# Patient Record
Sex: FEMALE | Race: WHITE
[De-identification: names, ages, dates, MRNs, and addresses within clinical notes are randomized per-mention and may not be internally consistent; named-entity substitution may affect disease eponyms.]

---

## 2017-07-12 ENCOUNTER — HOSPITAL ENCOUNTER (OUTPATIENT)
Dept: HOSPITAL 47 - RADUSWWP | Age: 37
Discharge: HOME | End: 2017-07-12
Payer: COMMERCIAL

## 2017-07-12 DIAGNOSIS — N92.1: Primary | ICD-10-CM

## 2017-07-12 PROCEDURE — 76856 US EXAM PELVIC COMPLETE: CPT

## 2017-07-12 NOTE — US
EXAMINATION TYPE: US pelvic complete

 

DATE OF EXAM: 7/12/2017

 

COMPARISON: NONE

 

CLINICAL HISTORY: 37-year-old female N92.1 Excessive and frequent menstruation with pelvic pain durin
g first 2 days of menses, tubal ligation 11 years ago

 

TECHNIQUE:  Transabdominal (TA)

 

FINDINGS:

 

Date of LMP:  1.5 weeks ago

 

Uterus: Anteverted measuring 8.6 x 4.2 x 5.6 cm slight myometrial heterogeneity.

 

Endometrial Stripe: 1.0 cm, within normal limits.

 

Right Ovary:  4.0 x 1.7 x 2.7 cm for a volume of 9.3 mL.

 

Left Ovary:  3.7 x 2.3 x 2.2 cm for a volume of 9.6 mL.

 

Follicular changes present on both sides.

 

Small amount of pelvic free fluid likely physiologic. No evident adnexal abnormality.

 

 

 

IMPRESSION: 

Small amount of pelvic free fluid likely physiologic. Slight myometrial heterogeneity may be seen in 
the setting of diffuse small fibroid change or adenomyosis.

## 2017-08-14 ENCOUNTER — HOSPITAL ENCOUNTER (OUTPATIENT)
Dept: HOSPITAL 47 - LABPAT | Age: 37
Discharge: HOME | End: 2017-08-14
Payer: COMMERCIAL

## 2017-08-14 DIAGNOSIS — Z01.812: Primary | ICD-10-CM

## 2017-08-14 LAB
BASOPHILS # BLD AUTO: 0 K/UL (ref 0–0.2)
BASOPHILS NFR BLD AUTO: 1 %
CH: 33.1
CHCM: 33.3
EOSINOPHIL # BLD AUTO: 0.2 K/UL (ref 0–0.7)
EOSINOPHIL NFR BLD AUTO: 3 %
ERYTHROCYTE [DISTWIDTH] IN BLOOD BY AUTOMATED COUNT: 4.2 M/UL (ref 3.8–5.4)
ERYTHROCYTE [DISTWIDTH] IN BLOOD: 12.9 % (ref 11.5–15.5)
HCT VFR BLD AUTO: 41.9 % (ref 34–46)
HDW: 2.12
HGB BLD-MCNC: 13.2 GM/DL (ref 11.4–16)
LUC NFR BLD AUTO: 2 %
LYMPHOCYTES # SPEC AUTO: 2.1 K/UL (ref 1–4.8)
LYMPHOCYTES NFR SPEC AUTO: 30 %
MCH RBC QN AUTO: 31.5 PG (ref 25–35)
MCHC RBC AUTO-ENTMCNC: 31.5 G/DL (ref 31–37)
MCV RBC AUTO: 99.7 FL (ref 80–100)
MONOCYTES # BLD AUTO: 0.3 K/UL (ref 0–1)
MONOCYTES NFR BLD AUTO: 5 %
NEUTROPHILS # BLD AUTO: 4 K/UL (ref 1.3–7.7)
NEUTROPHILS NFR BLD AUTO: 60 %
WBC # BLD AUTO: 0.11 10*3/UL
WBC # BLD AUTO: 6.8 K/UL (ref 3.8–10.6)
WBC (PEROX): 6.91

## 2017-08-14 PROCEDURE — 85025 COMPLETE CBC W/AUTO DIFF WBC: CPT

## 2017-08-20 NOTE — P.HPOB
History of Present Illness


H&P Date: 17


Chief Complaint: Menorrhagia, dysmenorrhea





This is a 37-year-old female  2 para 2, who presents for dilation and 

curettage with hysteroscopy and NovaSure endometrial ablation due to 

menorrhagia with regular cycle and dysmenorrhea.  Her menses are occurring 

approximately every 28 days and lasting 5-7 days with the first couple days 

very heavy to where she has to change pads every couple hours.  In addition she 

gets significant cramping and lower abdominal and back pain.  The pain radiates 

down to her lower back and thighs.  She occasionally has dyspareunia also.  She 

has been using Tylenol with some relief.  Her dysmenorrhea has been going on 

for almost 10 years.  Her painful intercourse has been present since the birth 

of her last child.  She has had a tubal ligation.  Her pelvic ultrasound showed 

a uterus measuring 8.6 x 4.2 x 5.6 cm with an endometrial stripe thickness of 1 

cm.  Both ovaries appeared normal size with small follicular change noted.





OB history: .  History of 2 vaginal deliveries.


Gynecologic history: She has had a tubal ligation.  She was treated for 

chlamydia many years ago.


Social history: She is  and works in a factory.





Review of Systems


Constitutional: Denies chills, Denies fever


Eyes: denies blurred vision, denies pain


Ears, nose, mouth and throat: Denies headache, Denies sore throat


Cardiovascular: Denies chest pain, Denies shortness of breath


Respiratory: Denies cough


Gastrointestinal: Reports abdominal pain


Genitourinary: Reports dysmenorrhea, Reports dyspareunia, Reports pelvic pain, 

Denies pregnant


Menstruation: Reports period heavy


Musculoskeletal: Reports low back pain


Integumentary: Denies pruritus, Denies rash


Neurological: Denies numbness, Denies weakness


Psychiatric: Denies anxiety, Denies depression


Endocrine: Denies fatigue, Denies weight change





Past Medical History


Past Medical History: Hyperlipidemia, Musculoskeletal Disorder (Scoliosis, 

degenerative disc disease), Osteoarthritis (OA)


Additional Past Medical History / Comment(s): arthritis taisha knees and lower back

, hearing loss taisha ears, occ migraines


History of Any Multi-Drug Resistant Organisms: None Reported


Past Surgical History: Cholecystectomy, Ear Surgery, Tubal Ligation


Additional Past Surgical History / Comment(s): reconstruction left ear/ tubes 

in ears,


Past Anesthesia/Blood Transfusion Reactions: Motion Sickness, Postoperative 

Nausea & Vomiting (PONV)


Additional Past Anesthesia/Blood Transfusion Reaction / Comment(s): hx PONV 

with general


Past Psychological History: No Psychological Hx Reported


Smoking Status: Current every day smoker


Past Alcohol Use History: None Reported


Past Drug Use History: None Reported





- Past Family History


  ** Mother


Family Medical History: Cancer (Cervical)





Medications and Allergies


 Home Medications











 Medication  Instructions  Recorded  Confirmed  Type


 


Acetaminophen Tab [Tylenol Tab] 1,000 mg PO Q6HR PRN 17 History


 


Cholecalciferol [Vitamin D3] 5,000 unit PO DAILY 17 History


 


Omega-3 Fatty Acids/Fish Oil [Fish 1 each PO DAILY 17 History





Oil 1,000 mg Softgel]    











 Allergies











Allergy/AdvReac Type Severity Reaction Status Date / Time


 


Milk Containing Products Allergy  congestion Verified 17 12:46





[Dairy]     


 


pollen extracts Allergy  congestion Verified 17 12:46


 


wheat Allergy  congestion Verified 17 12:46














Exam


Osteopathic Statement: *.  No significant issues noted on an osteopathic 

structural exam other than those noted in the History and Physical/Consult.





HEENT: Within normal limits


Heart: Regular rate and rhythm


Lungs: Clear to auscultation bilaterally


Abdomen: Soft, nontender


Pelvic exam: Uterus is retroverted, nontender, with no adnexal masses palpated 

but tenderness noted in bilateral adnexa.


Extremities: Negative Homans





Assessment and Plan


(1) Menorrhagia with regular cycle


Status: Acute   





(2) Dysmenorrhea


Status: Acute   


Plan: 





Proceed with dilation and curettage with hysteroscopy and NovaSure endometrial 

ablation.





I have discussed the risks, benefits, and alternative therapies for the above-

mentioned procedure and for both sedation/anesthesia as well as necessary blood 

products administration, if indicated, as they pertain to this patient.  The 

patient has indicated her understanding and acceptance of the risks and 

procedures discussed.

## 2017-08-21 ENCOUNTER — HOSPITAL ENCOUNTER (OUTPATIENT)
Dept: HOSPITAL 47 - OR | Age: 37
Discharge: HOME | End: 2017-08-21
Payer: COMMERCIAL

## 2017-08-21 VITALS — RESPIRATION RATE: 16 BRPM

## 2017-08-21 VITALS — TEMPERATURE: 97.3 F

## 2017-08-21 VITALS — BODY MASS INDEX: 25 KG/M2

## 2017-08-21 VITALS — SYSTOLIC BLOOD PRESSURE: 127 MMHG | DIASTOLIC BLOOD PRESSURE: 84 MMHG | HEART RATE: 68 BPM

## 2017-08-21 DIAGNOSIS — N94.10: ICD-10-CM

## 2017-08-21 DIAGNOSIS — N92.0: Primary | ICD-10-CM

## 2017-08-21 DIAGNOSIS — F17.200: ICD-10-CM

## 2017-08-21 DIAGNOSIS — Z98.51: ICD-10-CM

## 2017-08-21 DIAGNOSIS — N94.6: ICD-10-CM

## 2017-08-21 PROCEDURE — 81025 URINE PREGNANCY TEST: CPT

## 2017-08-21 PROCEDURE — 88305 TISSUE EXAM BY PATHOLOGIST: CPT

## 2017-08-21 PROCEDURE — 58563 HYSTEROSCOPY ABLATION: CPT

## 2017-08-21 NOTE — P.OP
Date of Procedure: 17


Preoperative Diagnosis: 


Menorrhagia with regular cycle


Dysmenorrhea


Postoperative Diagnosis: 


Same


Procedure(s) Performed: 


Hysteroscopy with dilation and curettage and NovaSure endometrial ablation


Implants: 





Anesthesia: other (Mask general)


Surgeon: Nichole Sinha


Estimated Blood Loss (ml): 2


Pathology: other (Endometrial curettings)


Condition: stable


Disposition: same day


Indications for Procedure: 


This is a 37-year-old female  2 para 2, who presents for dilation and 

curettage with hysteroscopy and NovaSure endometrial ablation due to 

menorrhagia with regular cycle and dysmenorrhea.  Her menses are occurring 

approximately every 28 days and lasting 5-7 days with the first couple days 

very heavy to where she has to change pads every couple hours.  In addition she 

gets significant cramping and lower abdominal and back pain.  The pain radiates 

down to her lower back and thighs.  She occasionally has dyspareunia also.  She 

has been using Tylenol with some relief.  Her dysmenorrhea has been going on 

for almost 10 years.  Her painful intercourse has been present since the birth 

of her last child.  She has had a tubal ligation.  Her pelvic ultrasound showed 

a uterus measuring 8.6 x 4.2 x 5.6 cm with an endometrial stripe thickness of 1 

cm.  Both ovaries appeared normal size with small follicular change noted.


Operative Findings: 


Uterus is sounded to 7-1/2 cm.  Cervix is sounded to 3 cm.  Upon hysteroscopy, 

a fairly dyssynchronous appearance was noted.  Both tubal ostia were 

visualized.  A moderate amount of endometrial curettings are obtained.  No 

adnexal masses are palpated.  Uterus is found to be anteverted.


Description of Procedure: 


The patient is taken to the operating room.  She is placed in the dorsal 

lithotomy position after general anesthesia was given.  She is prepped and 

draped in the normal sterile fashion.  Bladder is drained with a catheter and 

then removed.  Pelvic exam is performed under anesthesia.  Uterus is found to 

be anteverted with no adnexal masses.  She is placed in slight Trendelenburg 

position.  A right angle retractor is used to visualize the cervix.  The 

anterior lip of the cervix is grasped with a single-tooth tenaculum.  Cervix is 

sounded to 3 cm.  Uterus is sounded to 7.5 cm.  Cervix is gently dilated with 

Burns dilators until a hysteroscope could be passed.  Hysteroscopy is performed 

using normal saline.  The above noted findings are noted.  Next a polyp forceps 

is introduced.  And a minimal amount of tissue was obtained.  Next medium-sized 

size sharp curette was placed.  A moderate amount of endometrial curettings 

were obtained.  Next NovaSure array was inserted into the endometrial cavity.  

Length was set at 4.5 cm and width was determined to be 3.8 cm.  Next cavity 

assessment was completed and passed on the first try.  Next NovaSure array was 

fired at 94 W for 98 seconds.  Next the array was removed, inspected and then 

discarded.  Next the hysteroscope was reinserted.  Uniform charring was noted.  

Pictures were taken.  Hysteroscope was removed.  Single-tooth tenaculum was 

removed from the anterior lip of the cervix.  Minimal bleeding was noted.  All 

other instruments removed from the vagina.  Sponge counts were correct.  

Patient is taken to recovery room in stable condition.

## 2018-03-26 ENCOUNTER — HOSPITAL ENCOUNTER (OUTPATIENT)
Dept: HOSPITAL 47 - RADMAMWWP | Age: 38
Discharge: HOME | End: 2018-03-26
Payer: COMMERCIAL

## 2018-03-26 DIAGNOSIS — Z82.49: ICD-10-CM

## 2018-03-26 DIAGNOSIS — Z80.3: ICD-10-CM

## 2018-03-26 DIAGNOSIS — Z12.31: Primary | ICD-10-CM

## 2018-03-26 DIAGNOSIS — G44.59: ICD-10-CM

## 2018-03-26 PROCEDURE — 77067 SCR MAMMO BI INCL CAD: CPT

## 2018-03-26 PROCEDURE — 70460 CT HEAD/BRAIN W/DYE: CPT

## 2018-03-26 NOTE — CT
EXAMINATION TYPE: CT brain w con

 

DATE OF EXAM: 3/26/2018

 

COMPARISON: NONE

 

HISTORY: Headaches and family history of Aneurysm

 

CT DLP: 999.8 mGycm

Automated exposure control for dose reduction was used.

 

CONTRAST: 

CT scan of the head is performed with IV Contrast, patient injected with 100 mL of Isovue 300.

 

FINDINGS: 

There is no abnormal enhancing mass or midline shift identified.  The ventricles and sulci are within
 normal limits in size.  The globes are intact and the visualized sinuses are clear. Postsurgical lefty
nge involving the mastoid air cells on the left.

 

IMPRESSION: 

No acute intracranial process. If there is concern for aneurysm a MRA of the [place] [facility] [place] would b
e recommended.

## 2018-03-26 NOTE — MM
Reason for exam: screening  (asymptomatic).

Baseline mammogram.



History:

Family history of breast cancer in maternal grandmother.



Physical Findings:

Nurse did not find any significant physical abnormalities on exam.



MG Screening Mammo w CAD

Bilateral CC and MLO view(s) were taken.

There are scattered fibroglandular densities.

Finding: There are typically benign round calcifications in the right breast. 

There is no discrete abnormality.



These results were verbally communicated with the patient and result sheet given 

to the patient on 3/26/18.





ASSESSMENT: Benign, BI-RAD 2



RECOMMENDATION:

Routine screening mammogram of both breasts at age 40.

## 2020-05-09 ENCOUNTER — HOSPITAL ENCOUNTER (EMERGENCY)
Dept: HOSPITAL 47 - EC | Age: 40
LOS: 1 days | Discharge: HOME | End: 2020-05-10
Payer: COMMERCIAL

## 2020-05-09 ENCOUNTER — HOSPITAL ENCOUNTER (EMERGENCY)
Dept: HOSPITAL 47 - EC | Age: 40
Discharge: HOME | End: 2020-05-09
Payer: COMMERCIAL

## 2020-05-09 VITALS
RESPIRATION RATE: 18 BRPM | HEART RATE: 77 BPM | SYSTOLIC BLOOD PRESSURE: 142 MMHG | TEMPERATURE: 98.7 F | DIASTOLIC BLOOD PRESSURE: 87 MMHG

## 2020-05-09 VITALS
RESPIRATION RATE: 18 BRPM | TEMPERATURE: 99 F | HEART RATE: 87 BPM | SYSTOLIC BLOOD PRESSURE: 128 MMHG | DIASTOLIC BLOOD PRESSURE: 86 MMHG

## 2020-05-09 DIAGNOSIS — K03.81: ICD-10-CM

## 2020-05-09 DIAGNOSIS — F17.200: ICD-10-CM

## 2020-05-09 DIAGNOSIS — Z91.048: ICD-10-CM

## 2020-05-09 DIAGNOSIS — K02.9: ICD-10-CM

## 2020-05-09 DIAGNOSIS — Z91.011: ICD-10-CM

## 2020-05-09 DIAGNOSIS — K08.89: Primary | ICD-10-CM

## 2020-05-09 DIAGNOSIS — Z91.018: ICD-10-CM

## 2020-05-09 DIAGNOSIS — K04.7: Primary | ICD-10-CM

## 2020-05-09 PROCEDURE — 99282 EMERGENCY DEPT VISIT SF MDM: CPT

## 2020-05-09 NOTE — ED
ENT HPI





- General


Chief complaint: Dental/Oral


Stated complaint: tooth infection


Time Seen by Provider: 05/09/20 06:24


Source: patient, family, RN notes reviewed


Mode of arrival: ambulatory


Limitations: no limitations





- History of Present Illness


Initial comments: 





This a 39-year-old female presents emergency Department chief complaint of lower

dental pain.  Patient states that she's had already to move and upper aspect.  

Patient states that she has very poor dentition states that she needs her teeth 

removed but states that she can't see her dentist currently because her dentist 

is closed.  Denies any.  Chills no trismus.  Denies any difficulty swallowing.





- Related Data


                                Home Medications











 Medication  Instructions  Recorded  Confirmed


 


Acetaminophen Tab [Tylenol Tab] 1,000 mg PO Q6HR PRN 08/14/17 08/21/17


 


Cholecalciferol [Vitamin D3] 5,000 unit PO DAILY 08/14/17 08/21/17


 


Omega-3 Fatty Acids/Fish Oil [Fish 1 each PO DAILY 08/14/17 08/21/17





Oil 1,000 mg Softgel]   








                                  Previous Rx's











 Medication  Instructions  Recorded


 


Ibuprofen [Motrin] 600 mg PO Q8HR PRN #20 tab 05/09/20


 


Penicillin V Potassium [Pen Vee K] 500 mg PO QID #40 tablet 05/09/20











                                    Allergies











Allergy/AdvReac Type Severity Reaction Status Date / Time


 


Milk Containing Products Allergy  congestion Verified 05/09/20 06:22





[Dairy]     


 


pollen extracts Allergy  congestion Verified 05/09/20 06:22


 


wheat Allergy  congestion Verified 05/09/20 06:22














Review of Systems


ROS Statement: 


Those systems with pertinent positive or pertinent negative responses have been 

documented in the HPI.





ROS Other: All systems not noted in ROS Statement are negative.





Past Medical History


Past Medical History: No Reported History


Additional Past Medical History / Comment(s): pre-canerous cells uterus, 

arthritis taisha knees, hearing loss taisha ears, begining stages of carpal tunnel taisha

 wrists


History of Any Multi-Drug Resistant Organisms: None Reported


Past Surgical History: Tubal Ligation


Additional Past Surgical History / Comment(s): ear surgery


Past Anesthesia/Blood Transfusion Reactions: No Reported Reaction, Postoperative

 Nausea & Vomiting (PONV)


Past Psychological History: Bipolar


Smoking Status: Current every day smoker


Past Alcohol Use History: Rare


Past Drug Use History: Marijuana





General Exam


Limitations: no limitations


General appearance: alert, in no apparent distress


Head exam: Present: atraumatic, normocephalic, normal inspection


Eye exam: Present: normal appearance, PERRL, EOMI.  Absent: scleral icterus, 

conjunctival injection, periorbital swelling


ENT exam: Present: mucous membranes moist, TM's normal bilaterally, normal 

external ear exam.  Absent: normal oropharynx (Edentulous, multiple dental 

caries, dental fractures.  No drainable abscess.)


Neck exam: Present: normal inspection, full ROM.  Absent: tenderness, 

meningismus, lymphadenopathy


Respiratory exam: Present: normal lung sounds bilaterally.  Absent: respiratory 

distress, wheezes, rales, rhonchi, stridor


Cardiovascular Exam: Present: regular rate, normal rhythm, normal heart sounds. 

 Absent: systolic murmur, diastolic murmur, rubs, gallop, clicks


Neurological exam: Present: alert, oriented X3, CN II-XII intact, reflexes 

normal.  Absent: motor sensory deficit


Skin exam: Present: warm, dry, intact, normal color.  Absent: rash





Course





                                   Vital Signs











  05/09/20





  06:19


 


Temperature 98.7 F


 


Pulse Rate 77


 


Respiratory 18





Rate 


 


Blood Pressure 142/87


 


O2 Sat by Pulse 99





Oximetry 














Medical Decision Making





- Medical Decision Making


Patient has dental infection, dental fractures and dental caries.  Patient was 

placed on antibiotics, pain control provided.  Return parameters were discussed.








Disposition


Clinical Impression: 


 Dental caries, Toothache, Dental infection





Disposition: HOME SELF-CARE


Condition: Stable


Instructions (If sedation given, give patient instructions):  Toothache (ED)


Additional Instructions: 


Please return to the Emergency Department if symptoms worsen or any other 

concerns.


Prescriptions: 


Ibuprofen [Motrin] 600 mg PO Q8HR PRN #20 tab


 PRN Reason: Pain


Penicillin V Potassium [Pen Vee K] 500 mg PO QID #40 tablet


Is patient prescribed a controlled substance at d/c from ED?: No


Referrals: 


Zach Alarcon MD [Primary Care Provider] - 1-2 days


Time of Disposition: 06:35

## 2020-05-10 NOTE — ED
ENT HPI





- General


Chief complaint: Dental/Oral


Stated complaint: Recheck, tooth infection


Time Seen by Provider: 20 23:52


Source: patient


Mode of arrival: ambulatory


Limitations: no limitations





- History of Present Illness


Initial comments: 





This patient is a 39-year-old woman presenting for left mandibular tooth pain.  

The patient states that this been going on for going on 2 days now.  She was 

seen here earlier in the day and was given prescription for penicillin and 

ibuprofen 600 mg.  The patient states that her discharge papers directed her to 

return should any swelling develop and she has noticed swelling along the left 

mandible.  In addition she states that the only thing that seems to give her 

relief is when she takes ibuprofen 800 mg. She has taken a total of 3 doses of 

the penicillin.  The patient is not having any difficulty with speech or 

swallowing or breathing.  She has not noticed any neck swelling.  No fever or 

chills. 


MD complaint: tooth pain


Onset/Timin


-: days(s)


Severity: severe


Quality: aching


Consistency: constant


Improves with: none


Worsens with: none


Context- Dental: history of dental caries


Associated Symptoms: gum swelling, toothache





- Related Data


                                Home Medications











 Medication  Instructions  Recorded  Confirmed


 


Acetaminophen Tab [Tylenol Tab] 1,000 mg PO Q6HR PRN 17


 


Cholecalciferol [Vitamin D3] 5,000 unit PO DAILY 17


 


Omega-3 Fatty Acids/Fish Oil [Fish 1 each PO DAILY 17





Oil 1,000 mg Softgel]   








                                  Previous Rx's











 Medication  Instructions  Recorded


 


Ibuprofen [Motrin] 600 mg PO Q8HR PRN #20 tab 20


 


Penicillin V Potassium [Pen Vee K] 500 mg PO QID #40 tablet 20


 


Amoxicillin/Potassium Clav 1 tab PO Q12HR 10 Days #20 tab 05/10/20





[Augmentin 875-125 Tablet]  


 


Ibuprofen 800 mg PO TID #20 tablet 05/10/20


 


traMADol HCl [Ultram] 50 mg PO Q6H PRN #20 tab 05/10/20











                                    Allergies











Allergy/AdvReac Type Severity Reaction Status Date / Time


 


Milk Containing Products Allergy  congestion Verified 20 23:58





[Dairy]     


 


pollen extracts Allergy  congestion Verified 20 23:58


 


wheat Allergy  congestion Verified 05/09/20 23:58














Review of Systems


ROS Statement: 


Those systems with pertinent positive or pertinent negative responses have been 

documented in the HPI.





ROS Other: All systems not noted in ROS Statement are negative.


Constitutional: Denies: fever, chills


Eyes: Denies: eye pain, vision change


ENT: Reports: dental pain.  Denies: ear pain, throat pain, hearing loss


Respiratory: Denies: cough, dyspnea


Cardiovascular: Denies: chest pain, palpitations


Gastrointestinal: Denies: abdominal pain, vomiting


Skin: Denies: rash


Neurological: Denies: headache





Past Medical History


Past Medical History: No Reported History


Additional Past Medical History / Comment(s): pre-canerous cells uterus, 

arthritis taisha knees, hearing loss taisha ears, begining stages of carpal tunnel taisha

 wrists


History of Any Multi-Drug Resistant Organisms: None Reported


Past Surgical History: Tubal Ligation


Additional Past Surgical History / Comment(s): ear surgery


Past Anesthesia/Blood Transfusion Reactions: No Reported Reaction, Postoperative

 Nausea & Vomiting (PONV)


Past Psychological History: Bipolar


Smoking Status: Current every day smoker


Past Alcohol Use History: Rare


Past Drug Use History: Marijuana





General Exam


Limitations: no limitations


General appearance: alert, in no apparent distress


Head exam: Present: atraumatic, normocephalic


Eye exam: Present: normal appearance.  Absent: scleral icterus, conjunctival 

injection


ENT exam: Present: other (Patient does have a number of teeth with moderate to 

extensive caries.  There is some soft tissue swelling adjacent to the mandible, 

but no palpable abscess.  There is no neck or submandibular fullness or 

tenderness.)


Neck exam: Present: normal inspection, full ROM.  Absent: tenderness, 

meningismus, lymphadenopathy


Skin exam: Present: warm, dry, intact, normal color.  Absent: rash





Course


                                   Vital Signs











  20





  23:56


 


Temperature 99 F


 


Pulse Rate 87


 


Respiratory 18





Rate 


 


Blood Pressure 128/86


 


O2 Sat by Pulse 99





Oximetry 














Disposition


Clinical Impression: 


 Dental caries, Toothache





Disposition: HOME SELF-CARE


Condition: Good


Instructions (If sedation given, give patient instructions):  Toothache (ED)


Prescriptions: 


Amoxicillin/Potassium Clav [Augmentin 875-125 Tablet] 1 tab PO Q12HR 10 Days #20

tab


Ibuprofen 800 mg PO TID #20 tablet


traMADol HCl [Ultram] 50 mg PO Q6H PRN #20 tab


 PRN Reason: Pain


Is patient prescribed a controlled substance at d/c from ED?: Yes


When asked, does pt state using other controlled substances?: Yes


If prescribed controlled substance>3 days was MAPS reviewed?: Prescribed <3 Days


If opioid is for acute pain is fill amount 7 days or less?: Yes


If Rx opioid, was Start Talking consent form obtained?: Yes


Referrals: 


Zach Alarcon MD [Primary Care Provider] - 1-2 days

## 2020-06-08 ENCOUNTER — HOSPITAL ENCOUNTER (EMERGENCY)
Dept: HOSPITAL 47 - EC | Age: 40
Discharge: HOME | End: 2020-06-08
Payer: COMMERCIAL

## 2020-06-08 VITALS — DIASTOLIC BLOOD PRESSURE: 83 MMHG | HEART RATE: 75 BPM | SYSTOLIC BLOOD PRESSURE: 120 MMHG

## 2020-06-08 VITALS — TEMPERATURE: 98.3 F | RESPIRATION RATE: 18 BRPM

## 2020-06-08 DIAGNOSIS — F17.200: ICD-10-CM

## 2020-06-08 DIAGNOSIS — Z91.018: ICD-10-CM

## 2020-06-08 DIAGNOSIS — Z91.048: ICD-10-CM

## 2020-06-08 DIAGNOSIS — K08.89: ICD-10-CM
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
## 2020-06-08 DIAGNOSIS — S02.5XXA: ICD-10-CM

## 2020-06-08 DIAGNOSIS — K06.1: ICD-10-CM

## 2020-06-08 DIAGNOSIS — Z91.011: ICD-10-CM

## 2020-06-08 DIAGNOSIS — K04.7: Primary | ICD-10-CM

## 2020-06-08 PROCEDURE — 96372 THER/PROPH/DIAG INJ SC/IM: CPT

## 2020-06-08 PROCEDURE — 99283 EMERGENCY DEPT VISIT LOW MDM: CPT

## 2020-06-08 NOTE — ED
General Adult HPI





- General


Chief complaint: Dental/Oral


Stated complaint: Facial Abscess


Time Seen by Provider: 06/08/20 00:48


Source: patient


Mode of arrival: ambulatory


Limitations: no limitations





- History of Present Illness


Initial comments: 


39-year-old female patient presents to the emergency department today for 

evaluation of right-sided dental pain and facial swelling.  Patient states that 

symptoms started early this morning.  States she was recently treated for dental

infection on the left side but did complete all antibiotics.  Due to the corona 

virus pandemic patient's temp is has not been open, the open on Wednesday.  She 

denies any current fever or chills.  Denies nausea or vomiting.  Denies any 

difficulty swallowing.  Denies any swelling beneath her tongue her tongue 

discomfort. Patient denies any recent rash, cough, shortness of breath, chest 

pain, abdominal pain, diarrhea, constipation, back pain, numbness, tingling, 

dizziness, weakness, hematuria, dysuria, urinary urgency, urinary frequency, 

headache, visual changes, or any other complaints.








- Related Data


                                Home Medications











 Medication  Instructions  Recorded  Confirmed


 


Acetaminophen Tab [Tylenol Tab] 1,000 mg PO Q6HR PRN 08/14/17 08/21/17


 


Cholecalciferol [Vitamin D3] 5,000 unit PO DAILY 08/14/17 08/21/17


 


Omega-3 Fatty Acids/Fish Oil [Fish 1 each PO DAILY 08/14/17 08/21/17





Oil 1,000 mg Softgel]   








                                  Previous Rx's











 Medication  Instructions  Recorded


 


Ibuprofen [Motrin] 600 mg PO Q8HR PRN #20 tab 05/09/20


 


Penicillin V Potassium [Pen Vee K] 500 mg PO QID #40 tablet 05/09/20


 


Amoxicillin/Potassium Clav 1 tab PO Q12HR 10 Days #20 tab 05/10/20





[Augmentin 875-125 Tablet]  


 


Ibuprofen 800 mg PO TID #20 tablet 05/10/20


 


traMADol HCl [Ultram] 50 mg PO Q6H PRN #20 tab 05/10/20


 


Clindamycin HCl 300 mg PO Q6H #40 cap 06/08/20


 


Ibuprofen [Motrin] 600 mg PO Q8HR PRN #30 tab 06/08/20











                                    Allergies











Allergy/AdvReac Type Severity Reaction Status Date / Time


 


Milk Containing Products Allergy  congestion Verified 06/08/20 00:47





[Dairy]     


 


pollen extracts Allergy  congestion Verified 06/08/20 00:47


 


wheat Allergy  congestion Verified 06/08/20 00:47














Review of Systems


ROS Statement: 


Those systems with pertinent positive or pertinent negative responses have been 

documented in the HPI.





ROS Other: All systems not noted in ROS Statement are negative.





Past Medical History


Past Medical History: No Reported History


Additional Past Medical History / Comment(s): pre-canerous cells uterus, 

arthritis taisha knees, hearing loss taisha ears, begining stages of carpal tunnel taisha

 wrists,


History of Any Multi-Drug Resistant Organisms: None Reported


Past Surgical History: Tubal Ligation


Additional Past Surgical History / Comment(s): ear surgery,


Past Anesthesia/Blood Transfusion Reactions: No Reported Reaction, Postoperative

 Nausea & Vomiting (PONV)


Past Psychological History: Bipolar


Smoking Status: Current every day smoker


Past Alcohol Use History: Rare


Past Drug Use History: Marijuana





General Exam


Limitations: no limitations


General appearance: alert, in no apparent distress, other (This is a well-

developed, well-nourished adult female patient in no acute distress.  Vital 

signs upon presentation are temperature 98.3F, pulse 64, respirations 18, blood

 pressure 124/84, pulse ox 94% on room air.)


Eye exam: Present: normal appearance, PERRL, EOMI.  Absent: scleral icterus, 

conjunctival injection, periorbital swelling


ENT exam: Present: normal oropharynx, mucous membranes moist, other (There is 

right-sided facial swelling, poor dentition to the right lower dentition.  No 

evidence for drainable abscess.  There is surrounding gingival erythema or 

hyperplasia.)


Respiratory exam: Present: normal lung sounds bilaterally.  Absent: respiratory 

distress, wheezes, rales, rhonchi, stridor


Cardiovascular Exam: Present: regular rate, normal rhythm, normal heart sounds. 

 Absent: systolic murmur, diastolic murmur, rubs, gallop, clicks





Course


                                   Vital Signs











  06/08/20





  00:42


 


Temperature 98.3 F


 


Pulse Rate 64


 


Respiratory 18





Rate 


 


Blood Pressure 124/84


 


O2 Sat by Pulse 94 L





Oximetry 














Medical Decision Making





- Medical Decision Making


39-year-old female patient presents to the emergency department today for 

evaluation of right lower dental pain and facial swelling.  Physical examination

 did reveal poor dentition with multiple broken teeth to the right lower 

dentition.  There is surrounding gingival erythema and hyperplasia.  No evidence

 for drainable abscess.  We will start clindamycin give pain medications.  Her 

dentist opens Wednesday she is going to call for an appointment then.  She is 

instructed to follow-up with her primary care physician for recheck in 1-2 days.

  Return parameters were discussed in detail.  She verbalizes understanding and 

agrees with this plan.








Disposition


Clinical Impression: 


 Dental abscess





Disposition: HOME SELF-CARE


Condition: Good


Instructions (If sedation given, give patient instructions):  Dental Abscess 

(ED)


Additional Instructions: 


Take medications as directed. Follow up with the dentist as soon as possible.  

Follow-up with her primary care physician for recheck in 1-2 days.  Return to 

the emergency department immediately for any new, worsening, or concerning 

symptoms.


Prescriptions: 


Clindamycin HCl 300 mg PO Q6H #40 cap


Ibuprofen [Motrin] 600 mg PO Q8HR PRN #30 tab


 PRN Reason: Pain


Is patient prescribed a controlled substance at d/c from ED?: No


Referrals: 


Zach Alarcon MD [Primary Care Provider] - 1-2 days


Time of Disposition: 01:00

## 2021-08-12 ENCOUNTER — HOSPITAL ENCOUNTER (OUTPATIENT)
Dept: HOSPITAL 47 - RADXRMAIN | Age: 41
Discharge: HOME | End: 2021-08-12
Attending: EMERGENCY MEDICINE
Payer: COMMERCIAL

## 2021-08-12 DIAGNOSIS — M25.512: Primary | ICD-10-CM

## 2021-08-12 NOTE — XR
Result:

 

Clinical History: Pain.

 

Comparison: None available. 

 

Technique: 3 views of the left shoulder.

 

Findings: 

 

The bone mineralization is appropriate for age. 

 

No acute fracture or dislocation is seen.  The acromioclavicular and glenohumeral joints are preserve
d .  The humeral head is well-seated in the glenoid.  

 

The visualized lung is clear.  

 

 

Impression:     

 

No acute osseous abnormality.

## 2024-11-20 ENCOUNTER — HOSPITAL ENCOUNTER (OUTPATIENT)
Dept: HOSPITAL 47 - RADMAMWWP | Age: 44
Discharge: HOME | End: 2024-11-20
Attending: FAMILY MEDICINE
Payer: COMMERCIAL

## 2024-11-20 DIAGNOSIS — Z80.3: ICD-10-CM

## 2024-11-20 DIAGNOSIS — Z12.31: Primary | ICD-10-CM

## 2024-11-20 DIAGNOSIS — R92.323: ICD-10-CM

## 2024-11-20 PROCEDURE — 77067 SCR MAMMO BI INCL CAD: CPT

## 2024-11-20 PROCEDURE — 77063 BREAST TOMOSYNTHESIS BI: CPT

## 2024-12-04 ENCOUNTER — HOSPITAL ENCOUNTER (OUTPATIENT)
Dept: HOSPITAL 47 - RADMAMWWP | Age: 44
Discharge: HOME | End: 2024-12-04
Attending: FAMILY MEDICINE
Payer: COMMERCIAL

## 2024-12-04 DIAGNOSIS — Z80.3: ICD-10-CM

## 2024-12-04 DIAGNOSIS — R92.332: ICD-10-CM

## 2024-12-04 DIAGNOSIS — R92.8: Primary | ICD-10-CM

## 2024-12-04 PROCEDURE — 77061 BREAST TOMOSYNTHESIS UNI: CPT

## 2024-12-04 PROCEDURE — 77065 DX MAMMO INCL CAD UNI: CPT

## 2024-12-04 NOTE — MM
Reason for Exam: Additional evaluation requested from abnormal screening. 

Last screening mammogram was performed less than 1 month ago.





Patient History: 

Menarche at age 15. First Full-Term Pregnancy at age 19. Hysterectomy at age 44. Patient used

Hormonal Contraceptives for 4 years.

Maternal grandmother had breast cancer. 





Risk Values: 

Maddie 5 year model risk: 0.5%.

NCI Lifetime model risk: 6.5%.





Prior Study Comparison: 

3/26/2018 Bilateral Screening Mammogram, [facility]. 11/20/2024 Bilateral MG 3D screening mammo w/cad, [facility].







Tissue Density: 

Left: The breasts are heterogeneously dense, which may obscure small masses.





Findings: 

Analyzed By CAD. 

There are grouped heterogeneous calcifications spanning approximately 2.5 cm within the lateral

subareolar left breast. These are indeterminant and further tissue sampling recommended. 





Overall Assessment: Suspicious, BI-RAD 4





Management: 

Stereotactic Core Biopsy of the left breast.

Results were given to the patient verbally at the time of exam.



[facility], Workstation: [id number], 12/4/2024 2:25 PM.



Electronically signed and approved by: Amara Gamez M.D. Radiologist

## 2025-01-10 ENCOUNTER — HOSPITAL ENCOUNTER (OUTPATIENT)
Dept: HOSPITAL 47 - RADMAMWWP | Age: 45
Discharge: HOME | End: 2025-01-10
Attending: SURGERY
Payer: COMMERCIAL

## 2025-01-10 ENCOUNTER — HOSPITAL ENCOUNTER (OUTPATIENT)
Dept: HOSPITAL 47 - WWCWWP | Age: 45
End: 2025-01-10
Attending: SURGERY
Payer: COMMERCIAL

## 2025-01-10 VITALS
HEART RATE: 85 BPM | DIASTOLIC BLOOD PRESSURE: 57 MMHG | SYSTOLIC BLOOD PRESSURE: 98 MMHG | TEMPERATURE: 98.3 F | RESPIRATION RATE: 16 BRPM

## 2025-01-10 DIAGNOSIS — Z98.82: ICD-10-CM

## 2025-01-10 DIAGNOSIS — Z80.3: ICD-10-CM

## 2025-01-10 DIAGNOSIS — Z53.9: Primary | ICD-10-CM

## 2025-01-10 DIAGNOSIS — R92.8: Primary | ICD-10-CM

## 2025-01-10 PROCEDURE — 88341 IMHCHEM/IMCYTCHM EA ADD ANTB: CPT

## 2025-01-10 PROCEDURE — 19081 BX BREAST 1ST LESION STRTCTC: CPT

## 2025-01-10 PROCEDURE — 88305 TISSUE EXAM BY PATHOLOGIST: CPT

## 2025-01-10 NOTE — P.GSCN
History of Present Illness


Consult date: 01/10/25


Reason for Consult: 





Microcalcifications of concern left breast


Requesting physician: Zach Alarcon


History of present illness: 





     Is a 44-year-old female seen in consultation for Dr. Abel regarding a 

radiographic abnormality in the left breast.  She underwent a bilateral 

screening mammogram on 2024.  This revealed a new group of loosely 

clustered microcalcifications in the retroareolar left breast.  Additional views

were recommended.  The additional views of the left breast were performed on 

2024.  This revealed grouped heterogeneous calcifications spanning 2.5 cm 

within the lateral subareolar left breast.  This was felt to be BI-RADS 4 and 

stereotactic core biopsy was recommended.  The mammograms were personally 

reviewed and interpreted and discussed with Dr. Cisneros from radiology.  This was

found on a routine mammogram.  She is not complaining of any new lumps masses or

nodules of concern in either breast.  She is not complaining of any nipple 

discharge or skin changes.  She has not had any recent trauma or infection in 

the breast.  She has never had any breast biopsies or surgery on her breast.





Caffeine: coffee, pop, and energy drink daily drink coffee all day long


nicotine: 1/2 PPD and also vapes; hs been smoking since 


chocolate: occasional


BCP: used shot for 1 year


hormones: not yet but is having hot flashes








Family History:


patient had a hysterectomy pre cancer


mother: cervical cancer


maternal great grandmother: breast cancer twice








Hormonal History:


menarche: 16


, breast fed: no, age at first birth: 19


periods stopped after an ablation in her 30's


is having hot flashes now


had a partial hysterectomy Oct. 14th, done for pre-cancer cells








Surgical History:


hysterectomy


gallbladder


two major left ear reconstruction


8 tubes both ears








Medical History:


arthritis


compressed disc lower back with scoliosis


bipolar; depression, ADHD





Social History:


nicotine: as above


alcohol: none


drugs: none

















Review of Systems





- Constitutional


Reports sweats





- EENT


Eyes: denies blurred vision


Ears: bilateral: decreased hearing, tinnitus


Ears, nose, mouth and throat: Reports headache





- Breasts


bilateral: as per HPI





- Cardiovascular


Denies chest pain, Denies shortness of breath





- Respiratory


Reports as per HPI, Reports cough





- Gastrointestinal


Reports as per HPI





- Genitourinary


Genitourinary: Denies dysuria, Denies hematuria


Menstruation: Reports post hysterectomy





- Musculoskeletal


Reports as per HPI





- Integumentary


Reports pruritus, Reports unusual bruising, Denies rash





- Neurological


Reports headaches, Denies syncope





- Psychiatric


Reports as per HPI





- Endocrine


Reports fatigue, Reports weight change





- Hematologic/Lymphatic


Reports easy bruising





- Allergic/Immunologic


Reports as per HPI, Reports seasonal allergies





Past Medical History


Past Medical History: No Reported History


Additional Past Medical History / Comment(s): pre-canerous cells uterus, 

arthritis taisha knees, hearing loss taisha ears, begining stages of carpal tunnel taisha

wrists


History of Any Multi-Drug Resistant Organisms: None Reported


Past Surgical History: Ear Surgery, Hysterectomy, Tubal Ligation, Uterine 

Ablation


Additional Past Surgical History / Comment(s): ear surgery. Left ear 

reconstruction surgery .


Past Anesthesia/Blood Transfusion Reactions: No Reported Reaction, Postoperative

Nausea & Vomiting (PONV)


Past Psychological History: ADD/ADHD, Bipolar, Depression


Additional Psychological History / Comment(s): refuses medication other than 

Prozac


Smoking Status: Current every day smoker, Vaper


Past Alcohol Use History: Rare


Additional Past Alcohol Use History / Comment(s): 1 ppd


Past Drug Use History: None Reported





Medications and Allergies


                                Home Medications











 Medication  Instructions  Recorded  Confirmed  Type


 


Cholecalciferol [Vitamin D3] 5,000 unit PO DAILY 08/14/17 01/10/25 History


 


traMADol HCl [Ultram] 50 mg PO Q6H PRN #20 tab 05/10/20 01/10/25 Rx


 


FLUoxetine HCL [PROzac] 20 mg PO DAILY 12/05/24 01/10/25 History


 


Fluticasone Nasal Spray [Flonase 2 spray EA NOSTRIL BID 12/05/24 01/10/25 

History





Nasal Spray]    








                                    Allergies











Allergy/AdvReac Type Severity Reaction Status Date / Time


 


Milk Containing Products Allergy  congestion Verified 01/10/25 07:47





(Dairy)     





[Dairy]     


 


pollen extracts Allergy  congestion Verified 01/10/25 07:47


 


wheat Allergy  congestion Verified 01/10/25 07:47


 


hydromorphone [From Dilaudid] AdvReac  Nausea & Verified 01/10/25 07:47





   Vomiting  














Surgical - Exam


                                   Vital Signs











Temp Pulse Resp BP


 


 98.3 F   85   16   98/57 


 


 01/10/25 07:37  01/10/25 07:37  01/10/25 07:37  01/10/25 07:37














- General


moderate distress





- Eyes


normal ocular movement





- Neck


trachea midline





- Respiratory


normal respiratory effort, clear to auscultation





- Cardiovascular


Rhythm: regular


Heart Sounds: normal: S1, S2





- Abdomen


Abdomen: soft, non tender, no guarding, no rigid, no rebound





- Integumentary





normal turgor





- Neurologic


no disoriented, no combative





- Musculoskeletal


normal gait





- Psychiatric


oriented to time, oriented to person, oriented to place, speech is normal, 

memory intact





Breast Exam:


BRA:38D


Inspection: Bilateral grade 3 ptosis


Palpation:


Right breast: Multi positional exam fibrocystic changes no discrete dominant 

masses or nodules of concern


Right axilla: No adenopathy of concern


Left breast: Multi positional exam fibrocystic changes no discrete dominant 

masses or nodules of concern


Left axilla: No adenopathy of concern





tattoing over upper extermities





Results





Mammogram reviewed with Dr. Cisneros from radiology, microcalcifications of 

concern left breast retroareolar region





Assessment and Plan


Assessment: 





Impression:


Microcalcifications of concern left breast on mammogram performed 2024/diagnostic mammogram of left breast 2024





Plan:


Stereotactic core biopsy





Risk and benefits of the procedure discussed with the patient and her friend.  

Risk include but are not limited to bleeding, infection, reaction to the 

anesthetic.  If adequate tissue acquisition is not obtained or the biopsy is 

discordant then further tissue acquisition may be necessary.  They understand 

and she wishes to proceed.





CC: Dr Alarcon

## 2025-01-14 NOTE — P.PCN
Date of Procedure: 01/10/25


Preoperative Diagnosis: 


Microcalcifications of concern left breast


Postoperative Diagnosis: 


Same


Procedure(s) Performed: 


Left breast stereotactic core biopsy


Anesthesia: local


Surgeon: Keerthi De La Cruz


Pathology: other (Breast tissue/radiograph of specimen reveals 

microcalcifications)


Disposition: same day


Indications for Procedure: 


Microcalcifications of concern left breast


Operative Findings: 


Microcalcifications of concern in specimen


Description of Procedure: 


     Lisette is a 44-year-old female who underwent a mammogram.  She was noted 

to have microcalcifications of concern in the periareolar region of her left 

breast.  She was recommended to undergo stereotactic core biopsy.


The patient was brought to the stereotactic core biopsy room.  She was 

positioned in the upright chair.  A  film was obtained.  The area of 

concern was identified.  The lesion was targeted.  The breast was prepped using 

chlorhexidine.  A 9 gauge vacuum-assisted core rotating biopsy needle was driven

to the correct coordinates.  A prefire film was obtained.  The needle was noted 

to be in the correct location.  The needle was fired.  A post fire film was 

obtained.  The needle was noted to be in the correct location.  Core biopsy 

specimens were obtained.  Radiograph of the specimens revealed the 

calcifications of concern had been adequately sampled.  A marking clip was 

deployed and left behind.  The patient tolerated the procedure in stable 

condition.  Breast was anesthetized using 1% lidocaine 20 cc.  The patient will 

follow-up with Dr. Razo in 1 week.  Specimen was sent to pathology.

## 2025-01-15 NOTE — MM
Date of Procedure: 01/10/25

Preoperative Diagnosis: 

Microcalcifications of concern left breast

Postoperative Diagnosis: 

Same

Procedure(s) Performed: 

Left breast stereotactic core biopsy

Anesthesia: local

Surgeon: Keerthi De La Cruz

Pathology: other (Breast tissue/radiograph of specimen reveals 
microcalcifications)

Disposition: same day

Indications for Procedure: 

Microcalcifications of concern left breast

Operative Findings: 

Microcalcifications of concern in specimen

Description of Procedure: 

     Lisette is a 44-year-old female who underwent a mammogram.  She was noted 
to have microcalcifications of concern in the periareolar region of her left 
breast.  She was recommended to undergo stereotactic core biopsy.

The patient was brought to the stereotactic core biopsy room.  She was 
positioned in the upright chair.  A  film was obtained.  The area of 
concern was identified.  The lesion was targeted.  The breast was prepped using 
chlorhexidine.  A 9 gauge vacuum-assisted core rotating biopsy needle was driven
to the correct coordinates.  A prefire film was obtained.  The needle was noted 
to be in the correct location.  The needle was fired.  A post fire film was 
obtained.  The needle was noted to be in the correct location.  Core biopsy 
specimens were obtained.  Radiograph of the specimens revealed the 
calcifications of concern had been adequately sampled.  A marking clip was 
deployed and left behind.  The patient tolerated the procedure in stable 
condition.  Breast was anesthetized using 1% lidocaine 20 cc.  The patient will 
follow-up with Dr. Razo in 1 week.  Specimen was sent to pathology.


[facility]

## 2025-01-17 ENCOUNTER — HOSPITAL ENCOUNTER (OUTPATIENT)
Dept: HOSPITAL 47 - WWCWWP | Age: 45
End: 2025-01-17
Attending: SURGERY
Payer: COMMERCIAL

## 2025-01-17 VITALS
TEMPERATURE: 98.9 F | HEART RATE: 75 BPM | DIASTOLIC BLOOD PRESSURE: 76 MMHG | RESPIRATION RATE: 17 BRPM | SYSTOLIC BLOOD PRESSURE: 117 MMHG

## 2025-01-17 DIAGNOSIS — Z91.011: ICD-10-CM

## 2025-01-17 DIAGNOSIS — F17.210: ICD-10-CM

## 2025-01-17 DIAGNOSIS — R92.0: ICD-10-CM

## 2025-01-17 DIAGNOSIS — Z80.3: ICD-10-CM

## 2025-01-17 DIAGNOSIS — D05.12: Primary | ICD-10-CM

## 2025-01-17 DIAGNOSIS — Z91.018: ICD-10-CM

## 2025-01-17 DIAGNOSIS — Z88.8: ICD-10-CM

## 2025-01-17 DIAGNOSIS — R92.8: ICD-10-CM

## 2025-01-17 DIAGNOSIS — Z88.5: ICD-10-CM

## 2025-01-17 NOTE — P.PN
Subjective


Progress Note Date: 25


Principal diagnosis: 





DCIS left breast


DCIS left breast


Requesting physician: Zach Alarcon


History of present illness: 





     Is a 44-year-old female seen in consultation for Dr. Abel regarding a 

radiographic abnormality in the left breast.  She underwent a bilateral 

screening mammogram on 2024.  This revealed a new group of loosely 

clustered microcalcifications in the retroareolar left breast.  Additional views

were recommended.  The additional views of the left breast were performed on 

2024.  This revealed grouped heterogeneous calcifications spanning 2.5 cm 

within the lateral subareolar left breast.  This was felt to be BI-RADS 4 and 

stereotactic core biopsy was recommended.  The mammograms were personally 

reviewed and interpreted and discussed with Dr. Cisneros from radiology.  This was

found on a routine mammogram.  She is not complaining of any new lumps masses or

nodules of concern in either breast.  She is not complaining of any nipple 

discharge or skin changes.  She has not had any recent trauma or infection in 

the breast.  She has never had any breast biopsies or surgery on her breast.





status post stero biopsy on 1-10-25, (+) DCIS, reviewed with radiology, clip in 

the correct location about 1.6 mm area of concern





She tolerated the procedure without difficulty.





Caffeine: coffee, pop, and energy drink daily drink coffee all day long


nicotine: 1/2 PPD and also vapes; hs been smoking since 


chocolate: occasional


BCP: used shot for 1 year


hormones: not yet but is having hot flashes








Family History:


patient had a hysterectomy pre cancer


mother: cervical cancer


maternal great grandmother: breast cancer twice








Hormonal History:


menarche: 16


, breast fed: no, age at first birth: 19


periods stopped after an ablation in her 30's


is having hot flashes now


had a partial hysterectomy Oct. 14th, done for pre-cancer cells








Surgical History:


hysterectomy


gallbladder


two major left ear reconstruction


8 tubes both ears








Medical History:


arthritis


compressed disc lower back with scoliosis


bipolar; depression, ADHD





Social History:


nicotine: as above


alcohol: none


drugs: none

















Review of Systems





- Constitutional


Reports sweats





- EENT


Eyes: denies blurred vision


Ears: bilateral: decreased hearing, tinnitus


Ears, nose, mouth and throat: Reports headache





- Breasts


bilateral: as per HPI





- Cardiovascular


Denies chest pain, Denies shortness of breath





- Respiratory


Reports as per HPI, Reports cough





- Gastrointestinal


Reports as per HPI





- Genitourinary


Genitourinary: Denies dysuria, Denies hematuria


Menstruation: Reports post hysterectomy





- Musculoskeletal


Reports as per HPI





- Integumentary


Reports pruritus, Reports unusual bruising, Denies rash





- Neurological


Reports headaches, Denies syncope





- Psychiatric


Reports as per HPI





- Endocrine


Reports fatigue, Reports weight change





- Hematologic/Lymphatic


Reports easy bruising





- Allergic/Immunologic


Reports as per HPI, Reports seasonal allergies





Past Medical History


Past Medical History: No Reported History


Additional Past Medical History / Comment(s): pre-canerous cells uterus, 

arthritis taisha knees, hearing loss taisha ears, begining stages of carpal tunnel taisha

wrists


History of Any Multi-Drug Resistant Organisms: None Reported


Past Surgical History: Ear Surgery, Hysterectomy, Tubal Ligation, Uterine 

Ablation


Additional Past Surgical History / Comment(s): ear surgery. Left ear ryann

nstruction surgery .


Past Anesthesia/Blood Transfusion Reactions: No Reported Reaction, Postoperative

Nausea & Vomiting (PONV)


Past Psychological History: ADD/ADHD, Bipolar, Depression


Additional Psychological History / Comment(s): refuses medication other than 

Prozac


Smoking Status: Current every day smoker, Vaper


Past Alcohol Use History: Rare


Additional Past Alcohol Use History / Comment(s): 1 ppd


Past Drug Use History: None Reported





Medications and Allergies


                                Home Medications











 Medication  Instructions  Recorded  Confirmed  Type


 


Cholecalciferol [Vitamin D3] 5,000 unit PO DAILY 08/14/17 01/10/25 History


 


traMADol HCl [Ultram] 50 mg PO Q6H PRN #20 tab 05/10/20 01/10/25 Rx


 


FLUoxetine HCL [PROzac] 20 mg PO DAILY 12/05/24 01/10/25 History


 


Fluticasone Nasal Spray [Flonase 2 spray EA NOSTRIL BID 12/05/24 01/10/25 

History





Nasal Spray]    








                                    Allergies











Allergy/AdvReac Type Severity Reaction Status Date / Time


 


Milk Containing Products Allergy  congestion Verified 01/10/25 07:47





(Dairy)     





[Dairy]     


 


pollen extracts Allergy  congestion Verified 01/10/25 07:47


 


wheat Allergy  congestion Verified 01/10/25 07:47


 


hydromorphone [From Dilaudid] AdvReac  Nausea & Verified 01/10/25 07:47





   Vomiting  




















Objective





- Constitutional


General appearance: Present: cooperative





- EENT


Eyes: Present: EOMI


ENT: Present: hearing grossly normal





- Neck


Neck: Present: normal ROM





- Respiratory


Respiratory: bilateral: CTA





- Cardiovascular


Rhythm: regular


Heart sounds: normal: S1, S2





- Integumentary


Integumentary: Present: normal turgor





- Musculoskeletal


Musculoskeletal: Present: gait normal





- Psychiatric


Psychiatric: Present: A&O x's 3, appropriate affect, intact judgment & insight





- Additional findings


Additional findings: 


Breast Exam:


BRA:38D


Inspection: Bilateral grade 3 ptosis


Palpation:


Right breast: Multi positional exam fibrocystic changes no discrete dominant 

masses or nodules of concern


Right axilla: No adenopathy of concern


Left breast: Multi positional exam fibrocystic changes no discrete dominant 

masses or nodules of concern


Left axilla: No adenopathy of concern





tattoing over upper extermities











Assessment and Plan


Assessment: 


Impression:


Microcalcifications of concern left breast on mammogram performed 

2024/diagnostic mammogram of left breast 2024


Biopsy 1  10  25/DCIS left breast





Plan:


presentation of case ot tumor board


probable needle localization lumpevtomy





The results are discussed with her and her boyfriend.





CC: Dr Alarcon

## 2025-03-13 ENCOUNTER — HOSPITAL ENCOUNTER (OUTPATIENT)
Dept: HOSPITAL 47 - WWCWWP | Age: 45
End: 2025-03-13
Attending: SURGERY
Payer: COMMERCIAL

## 2025-03-13 VITALS
HEART RATE: 72 BPM | SYSTOLIC BLOOD PRESSURE: 111 MMHG | DIASTOLIC BLOOD PRESSURE: 70 MMHG | TEMPERATURE: 98.3 F | RESPIRATION RATE: 16 BRPM

## 2025-03-13 DIAGNOSIS — Z91.030: ICD-10-CM

## 2025-03-13 DIAGNOSIS — Z88.5: ICD-10-CM

## 2025-03-13 DIAGNOSIS — Z91.011: ICD-10-CM

## 2025-03-13 DIAGNOSIS — D05.12: Primary | ICD-10-CM

## 2025-03-13 DIAGNOSIS — Z91.018: ICD-10-CM

## 2025-03-13 DIAGNOSIS — R92.0: ICD-10-CM

## 2025-03-13 DIAGNOSIS — F17.210: ICD-10-CM

## 2025-03-13 NOTE — P.BCPN
Subjective


Progress Note Date: 25











Subjective


Progress Note Date: 


Principal diagnosis: 





DCIS left breast


DCIS left breast


Requesting physician: Zach Alarcon


History of present illness: 





     Is a 44-year-old female seen in consultation for Dr. Abel regarding a 

radiographic abnormality in the left breast.  She underwent a bilateral 

screening mammogram on 2024.  This revealed a new group of loosely 

clustered microcalcifications in the retroareolar left breast.  Additional views

were recommended.  The additional views of the left breast were performed on 

2024.  This revealed grouped heterogeneous calcifications spanning 2.5 cm 

within the lateral subareolar left breast.  This was felt to be BI-RADS 4 and 

stereotactic core biopsy was recommended.  The mammograms were personally 

reviewed and interpreted and discussed with Dr. Cisneros from radiology.  This was

found on a routine mammogram.  She is not complaining of any new lumps masses or

nodules of concern in either breast.  She is not complaining of any nipple 

discharge or skin changes.  She has not had any recent trauma or infection in 

the breast.  She has never had any breast biopsies or surgery on her breast.





status post stero biopsy on 1-10-25, (+) DCIS, reviewed with radiology, clip in 

the correct location about 1.6 mm area of concern





She tolerated the procedure without difficulty.





Case presented at tumor board and 2025.  The patient was called with the 

recommendations.  The patient has a retroareolar DCIS which is approximately 1.5

cm from the nipple areolar complex.  We have discussed taking the nipple areolar

complex versus leaving it in a central lumpectomy and the patient would like it 

to be taken as she thinks that there would be less risk of having to come back 

for more surgery.  I have discussed with her that I may very safely be able to 

leave that but at this time she would like to have a central lumpectomy and have

this removed.  We are awaiting genetic testing that is going to be done on 25








Genetic testing: VUS











Caffeine: coffee, pop, and energy drink daily drink coffee all day long


nicotine: 1/2 PPD and also vapes; hs been smoking since 


chocolate: occasional


BCP: used shot for 1 year


hormones: not yet but is having hot flashes








Family History:


patient had a hysterectomy pre cancer


mother: cervical cancer


maternal great grandmother: breast cancer twice








Hormonal History:


menarche: 16


, breast fed: no, age at first birth: 19


periods stopped after an ablation in her 30's


is having hot flashes now


had a partial hysterectomy Oct. 14th, done for pre-cancer cells








Surgical History:


hysterectomy


gallbladder


two major left ear reconstruction


8 tubes both ears








Medical History:


arthritis


compressed disc lower back with scoliosis


bipolar; depression, ADHD





Social History:


nicotine: as above


alcohol: none


drugs: none

















Review of Systems





- Constitutional


Reports sweats





- EENT


Eyes: denies blurred vision


Ears: bilateral: decreased hearing, tinnitus


Ears, nose, mouth and throat: Reports headache





- Breasts


bilateral: as per HPI





- Cardiovascular


Denies chest pain, Denies shortness of breath





- Respiratory


Reports as per HPI, Reports cough





- Gastrointestinal


Reports as per HPI





- Genitourinary


Genitourinary: Denies dysuria, Denies hematuria


Menstruation: Reports post hysterectomy





- Musculoskeletal


Reports as per HPI





- Integumentary


Reports pruritus, Reports unusual bruising, Denies rash





- Neurological


Reports headaches, Denies syncope





- Psychiatric


Reports as per HPI





- Endocrine


Reports fatigue, Reports weight change





- Hematologic/Lymphatic


Reports easy bruising





- Allergic/Immunologic


Reports as per HPI, Reports seasonal allergies





Past Medical History


Past Medical History: No Reported History


Additional Past Medical History / Comment(s): pre-canerous cells uterus, 

arthritis taisha knees, hearing loss atisha ears, begining stages of carpal tunnel taisha

wrists


History of Any Multi-Drug Resistant Organisms: None Reported


Past Surgical History: Ear Surgery, Hysterectomy, Tubal Ligation, Uterine 

Ablation


Additional Past Surgical History / Comment(s): ear surgery. Left ear 

reconstruction surgery .


Past Anesthesia/Blood Transfusion Reactions: No Reported Reaction, Postoperative

Nausea & Vomiting (PONV)


Past Psychological History: ADD/ADHD, Bipolar, Depression


Additional Psychological History / Comment(s): refuses medication other than 

Prozac


Smoking Status: Current every day smoker, Vaper


Past Alcohol Use History: Rare


Additional Past Alcohol Use History / Comment(s): 1 ppd


Past Drug Use History: None Reported





Medications and Allergies


                                Home Medications











 Medication  Instructions  Recorded  Confirmed  Type


 


Cholecalciferol [Vitamin D3] 5,000 unit PO DAILY 08/14/17 01/10/25 History


 


traMADol HCl [Ultram] 50 mg PO Q6H PRN #20 tab 05/10/20 01/10/25 Rx


 


FLUoxetine HCL [PROzac] 20 mg PO DAILY 12/05/24 01/10/25 History


 


Fluticasone Nasal Spray [Flonase 2 spray EA NOSTRIL BID 12/05/24 01/10/25 Histor

y





Nasal Spray]    








                                    Allergies











Allergy/AdvReac Type Severity Reaction Status Date / Time


 


Milk Containing Products Allergy  congestion Verified 01/10/25 07:47





(Dairy)     





[Dairy]     


 


pollen extracts Allergy  congestion Verified 01/10/25 07:47


 


wheat Allergy  congestion Verified 01/10/25 07:47


 


hydromorphone [From Dilaudid] AdvReac  Nausea & Verified 01/10/25 07:47





   Vomiting  


























Objective





- Constitutional


General appearance: Present: cooperative





- EENT


Eyes: Present: EOMI


ENT: Present: hearing grossly normal





- Neck


Neck: Present: normal ROM





- Breast


Breast-Narrative: 


Breast Exam:


BRA:38D


Inspection: Bilateral grade 3 ptosis


Palpation:


Right breast: Multi positional exam fibrocystic changes no discrete dominant 

masses or nodules of concern


Right axilla: No adenopathy of concern


Left breast: Multi positional exam fibrocystic changes no discrete dominant 

masses or nodules of concern


Left axilla: No adenopathy of concern





tattoing over upper extermities





Bra Size: 38D


Ptosis: Grade 3: Right Breast Ptosis, Left Breast Ptosis


Breast: left: normal


Right Breast Palpation (Multi-positional): No dominant masses, Fibrocystic 

Changes


Left Breast Palpation (Multi-positional): No dominant masses, Fibrocystic Change

s


Right Axilla Palpation: No adenopathy of concern


Left Axilla Palpation: No adenopathy of concern





- Respiratory


Respiratory: bilateral: CTA





- Cardiovascular


Rhythm: regular


Heart sounds: normal: S1, S2





- Gastrointestinal


General gastrointestinal: Present: soft





- Integumentary


Integumentary Comment(s): 





tatooing


Integumentary: Present: normal turgor





- Musculoskeletal


Musculoskeletal: Present: gait normal





- Psychiatric


Psychiatric: Present: A&O x's 3, appropriate affect, intact judgment & insight





Assessment and Plan


Plan: 


Impression:


Microcalcifications of concern left breast on mammogram performed 

2024/diagnostic mammogram of left breast 2024


Biopsy 1  10  25/DCIS left breast





Plan:


presentation of case at tumor board/ done


left breast  needle localization lumpectomy possible oncoplastic tissue transfer

left breast to be done via a central lumpectomy


clearance Dr. Alarcon





The results are discussed with her and her boyfriend.





She presently takes tramadol daily and will not need a prescription for pain 

medicine. 








Risk and benefits of the procedure discussed with the patient and her boyfriend.

 Risk include but are not limited to bleeding, infection, reaction to the 

anesthetic.  If margins were to be positive then further tissue acquisition may 

be necessary.  She is going to have removal of the nipple areolar complex and 

understands that this will not grow back.











CC: Dr Alarcon








Prep Education Provided





- Preoperative Education Given


Pre-Op Kit Given Date: 25





- Functional Assessment


Performed?: Yes


Referal Provided?: No





- Smoking Cessation


Education Provided?: Yes (educated to stop)

## 2025-03-25 ENCOUNTER — HOSPITAL ENCOUNTER (OUTPATIENT)
Dept: HOSPITAL 47 - OR | Age: 45
Discharge: HOME | End: 2025-03-25
Attending: SURGERY
Payer: COMMERCIAL

## 2025-03-25 VITALS — DIASTOLIC BLOOD PRESSURE: 60 MMHG | RESPIRATION RATE: 18 BRPM | HEART RATE: 89 BPM | SYSTOLIC BLOOD PRESSURE: 112 MMHG

## 2025-03-25 VITALS — TEMPERATURE: 96.9 F

## 2025-03-25 DIAGNOSIS — M17.0: ICD-10-CM

## 2025-03-25 DIAGNOSIS — G56.03: ICD-10-CM

## 2025-03-25 DIAGNOSIS — Z88.8: ICD-10-CM

## 2025-03-25 DIAGNOSIS — J45.909: ICD-10-CM

## 2025-03-25 DIAGNOSIS — F17.290: ICD-10-CM

## 2025-03-25 DIAGNOSIS — D05.12: Primary | ICD-10-CM

## 2025-03-25 DIAGNOSIS — F06.4: ICD-10-CM

## 2025-03-25 DIAGNOSIS — Z90.710: ICD-10-CM

## 2025-03-25 DIAGNOSIS — M41.9: ICD-10-CM

## 2025-03-25 DIAGNOSIS — Z90.49: ICD-10-CM

## 2025-03-25 DIAGNOSIS — H91.93: ICD-10-CM

## 2025-03-25 DIAGNOSIS — F32.A: ICD-10-CM

## 2025-03-25 DIAGNOSIS — Z79.899: ICD-10-CM

## 2025-03-25 DIAGNOSIS — F90.9: ICD-10-CM

## 2025-03-25 DIAGNOSIS — Z98.51: ICD-10-CM

## 2025-03-25 DIAGNOSIS — F17.210: ICD-10-CM

## 2025-03-25 DIAGNOSIS — G89.4: ICD-10-CM

## 2025-03-25 PROCEDURE — 88307 TISSUE EXAM BY PATHOLOGIST: CPT

## 2025-03-25 PROCEDURE — 76098 X-RAY EXAM SURGICAL SPECIMEN: CPT

## 2025-03-25 PROCEDURE — 19281 PERQ DEVICE BREAST 1ST IMAG: CPT

## 2025-03-25 PROCEDURE — 19301 PARTIAL MASTECTOMY: CPT

## 2025-03-25 RX ADMIN — DEXAMETHASONE SODIUM PHOSPHATE ONE MG: 4 INJECTION, SOLUTION INTRAMUSCULAR; INTRAVENOUS at 10:12

## 2025-03-25 RX ADMIN — LIDOCAINE HYDROCHLORIDE ONE ML: 10 INJECTION, SOLUTION INFILTRATION; PERINEURAL at 09:46

## 2025-03-25 RX ADMIN — ONDANSETRON ONE MG: 2 INJECTION INTRAMUSCULAR; INTRAVENOUS at 10:12

## 2025-03-25 RX ADMIN — SODIUM BICARBONATE ONE ML: 84 INJECTION, SOLUTION INTRAVENOUS at 09:46

## 2025-03-25 RX ADMIN — HEPARIN SODIUM PRN UNIT: 5000 INJECTION, SOLUTION INTRAVENOUS; SUBCUTANEOUS at 10:13

## 2025-03-25 RX ADMIN — METHYLENE BLUE ONE MG: 5 INJECTION INTRAVENOUS at 09:55

## 2025-03-25 RX ADMIN — ACETAMINOPHEN PRN MG: 500 TABLET ORAL at 08:49

## 2025-03-25 RX ADMIN — POTASSIUM CHLORIDE SCH MLS/HR: 14.9 INJECTION, SOLUTION INTRAVENOUS at 09:11

## 2025-03-25 RX ADMIN — LIDOCAINE HYDROCHLORIDE ONE ML: 10 INJECTION, SOLUTION INFILTRATION; PERINEURAL at 12:27

## 2025-03-25 RX ADMIN — POTASSIUM CHLORIDE ONE MLS: 14.9 INJECTION, SOLUTION INTRAVENOUS at 09:12

## 2025-03-25 NOTE — P.BCAON
Date of Procedure: 03/25/25


Preoperative Diagnosis: 


DCIS left breast


Postoperative Diagnosis: 


Same


Procedure(s) Performed: 


Left breast needle localization lumpectomy, oncoplastic tissue transfer 54 cm


Anesthesia: KRISHANA


Surgeon: Keerthi De La Cruz


Estimated Blood Loss (ml): 5


IV fluids (ml): 600


Pathology: other (Breast tissue)


Condition: stable


Disposition: same day


Indications for Procedure: 


Biopsy-proven left breast DCIS


Operative Findings: 


Fibrofatty breast tissue


Description of Procedure: 


The patient was seen in the radiology department for needle positioning of the 

clip of concern in the microcalcifications was performed.  1 cc of half percent 

methylene blue was injected into the site.  Following this the patient was 

brought to the operative suite.  In the operating suite following induction of 

anesthesia the left breast was prepped and draped in a sterile fashion.  An 

incision was made around the nipple areolar complex and a central lumpectomy was

performed with excision of the tissue localized by the wire localization as well

as the blue tissue noted on methylene blue.  Wide excision was performed.  After

reassured that hemostasis was attained titanium clips were placed in the cavity.

 Surgicel in powder form was placed.  A superior pillar 7 x 3 cm was formed in 

the subcutaneous tissue.  An inferior pillar 5 x 3 cm was formed in the 

subcutaneous tissue.  The cavity itself was 6 x 3 cm.  The superior and inferior

pillar were brought together and secured using 3-0 Vicryl suture.  Total 

oncoplastic tissue transfer was 54 cm.  The deep tissues were closed using 3-0 

Vicryl suture.  The skin was closed using 4-0 Monocryl.  20 cc of 1% lidocaine 

was injected into the incision.  Surgical glue was applied.  The patient 

tolerated the procedure in stable condition.  All instrument and sponge counts 

were correct at the end of the case.

## 2025-03-25 NOTE — P.NAPBC
NAPBC Queries





- NAPBC Queries


Was patient's case review presented at [facility] tumor board? If no, comment.: Yes


Was patient's pathology reviewed at [facility]? If no, comment.: Yes


Was breast conservation surgery offered? If no, comment.: Yes


Was sentinel node biopsy offered? If no, comment.: No (DCIS not recommended)


Was diagnosis confirmed by percutaneous core biopsy? If no, comment.: Yes


Is patient mastectomy patient?: No


Clinical Stage: 





staage 0 DCIS

## 2025-04-02 NOTE — MM
Prior Study Comparison: 

3/26/2018 Bilateral Screening Mammogram, [facility]. 11/20/2024 Bilateral MG 3D screening mammo w/cad, [facility].

12/4/2024 Left MG 3D work up w/cad LT, [facility]. 

Pathology Description: 

Approach: Lateral to Medial Needle Type: 5 cm Kopan

The procedure of needle localization with wire placement and than surgical excision was explained to

the patient.  Benefits, alternatives, and risks were discussed.  An informed consent was then

obtained.



The shortest pathway for procedure was chosen.  Shortest pathway was lateral approach. The overlying

skin was prepped and draped in usual sterile fashion.  Lidocaine buffered with bicarbonate was used

as anesthetic into the skin and subcutaneous tissue up to the level of area of concern.  A 5 cm

needle was used.  It was placed via a lateral approach under mammographic guidance.  Subsequent 90

degrees mammogram show the needle to be in satisfactory position relative to the targeted area.  At

this point, wire was placed and the needle was withdrawn.  The wire was fixed to patient's skin.

Images were marked for surgeon.



The patient tolerated the procedure well without any immediate complication.  The patient was kept

in the radiology department for short stay after the procedure and then taken to surgery for

surgical excision.  Targeted surgical clip and wire are identified in specimen mammogram.  The

patient was kept in hospital for short stay after the procedure and then discharged home in stable

condition.



Impression:

Successful, uncomplicated needle localization with wire placement and surgical excision of surgical

clip in the left breast, full pathology results to follow.





[facility], Workstation: [id number], 3/25/2025 12:37 PM. 





Pathology Results: 

Result: Malignant, Ductal carcinoma in situ, comedo type.

Pathology and radiology were reviewed. Findings are concordant.



LEFT BREAST, LUMPECTOMY: High grade ductal carcinoma in situ (DCIS) with focal comedo necrosis,

microcalcification and lobular extension (see Surgical Pathology Cancer Case Summary and comment).

All margins negative for DCIS with DCIS very close to and less than 1 mm from inferior margin. 





Overall Assessment: Malignant





Management: 

Surgical Consultation of the left breast.

Electronically signed and approved by: Aman Florentino M.D. Radiologis

## 2025-04-03 ENCOUNTER — HOSPITAL ENCOUNTER (OUTPATIENT)
Dept: HOSPITAL 47 - WWCWWP | Age: 45
End: 2025-04-03
Attending: SURGERY
Payer: COMMERCIAL

## 2025-04-03 VITALS
DIASTOLIC BLOOD PRESSURE: 79 MMHG | TEMPERATURE: 98.1 F | SYSTOLIC BLOOD PRESSURE: 118 MMHG | HEART RATE: 68 BPM | RESPIRATION RATE: 16 BRPM

## 2025-04-03 DIAGNOSIS — Z91.048: ICD-10-CM

## 2025-04-03 DIAGNOSIS — Z98.890: ICD-10-CM

## 2025-04-03 DIAGNOSIS — D05.10: Primary | ICD-10-CM

## 2025-04-03 DIAGNOSIS — Z88.5: ICD-10-CM

## 2025-04-03 DIAGNOSIS — Z91.011: ICD-10-CM

## 2025-04-18 ENCOUNTER — HOSPITAL ENCOUNTER (OUTPATIENT)
Dept: HOSPITAL 47 - WWCWWP | Age: 45
End: 2025-04-18
Attending: SURGERY
Payer: COMMERCIAL

## 2025-04-18 VITALS
HEART RATE: 69 BPM | TEMPERATURE: 97.9 F | RESPIRATION RATE: 18 BRPM | DIASTOLIC BLOOD PRESSURE: 82 MMHG | SYSTOLIC BLOOD PRESSURE: 123 MMHG

## 2025-04-18 DIAGNOSIS — Z98.890: ICD-10-CM

## 2025-04-18 DIAGNOSIS — D05.12: Primary | ICD-10-CM

## 2025-06-03 ENCOUNTER — HOSPITAL ENCOUNTER (OUTPATIENT)
Dept: HOSPITAL 47 - OR | Age: 45
LOS: 1 days | Discharge: HOME | End: 2025-06-04
Attending: SURGERY
Payer: COMMERCIAL

## 2025-06-03 DIAGNOSIS — F31.81: ICD-10-CM

## 2025-06-03 DIAGNOSIS — Z80.3: ICD-10-CM

## 2025-06-03 DIAGNOSIS — Z88.5: ICD-10-CM

## 2025-06-03 DIAGNOSIS — Z79.899: ICD-10-CM

## 2025-06-03 DIAGNOSIS — M41.86: ICD-10-CM

## 2025-06-03 DIAGNOSIS — F90.9: ICD-10-CM

## 2025-06-03 DIAGNOSIS — Z17.0: ICD-10-CM

## 2025-06-03 DIAGNOSIS — F17.290: ICD-10-CM

## 2025-06-03 DIAGNOSIS — F06.4: ICD-10-CM

## 2025-06-03 DIAGNOSIS — F17.210: ICD-10-CM

## 2025-06-03 DIAGNOSIS — M48.56XA: ICD-10-CM

## 2025-06-03 DIAGNOSIS — D05.12: Primary | ICD-10-CM

## 2025-06-03 DIAGNOSIS — Z91.011: ICD-10-CM

## 2025-06-03 DIAGNOSIS — G89.4: ICD-10-CM

## 2025-06-03 DIAGNOSIS — Z91.018: ICD-10-CM

## 2025-06-03 DIAGNOSIS — M54.40: ICD-10-CM

## 2025-06-03 DIAGNOSIS — Z17.21: ICD-10-CM

## 2025-06-03 LAB
ANISOCYTOSIS BLD QL SMEAR: (no result)
BASO STIPL BLD QL SMEAR: (no result)
BURR CELLS BLD QL SMEAR: (no result)
DACRYOCYTES BLD QL SMEAR: (no result)
DOHLE BOD BLD QL SMEAR: (no result)
HOWELL-JOLLY BOD BLD QL SMEAR: (no result)
HYPOCHROMIA BLD QL SMEAR: (no result)
LG PLATELETS BLD QL SMEAR: (no result)
Lab: (no result)
NEUTS HYPERSEG # BLD: (no result) 10*3/UL
NRBC BLD AUTO-RTO: (no result) %
OVALOCYTES BLD QL SMEAR: (no result)
PELGER HUET CELLS BLD QL SMEAR: (no result)
PLATELETS.RETICULATED NFR BLD AUTO: (no result) %
POIKILOCYTOSIS BLD QL SMEAR: (no result)
POIKILOCYTOSIS BLD QL SMEAR: (no result)
POLYCHROMASIA BLD QL SMEAR: (no result)
RBC MORPH BLD: (no result)
ROULEAUX BLD QL SMEAR: (no result)
SCHISTOCYTES BLD QL SMEAR: (no result)
SICKLE CELLS BLD QL SMEAR: (no result)
SPHEROCYTES BLD QL SMEAR: (no result)
STOMATOCYTES BLD QL SMEAR: (no result)
TARGETS BLD QL SMEAR: (no result)
TOXIC GRANULES BLD QL SMEAR: (no result)
VARIANT LYMPHS BLD QL SMEAR: (no result)
WBC NRBC COR # BLD: (no result) K/UL
WBC TOXIC VACUOLES BLD QL SMEAR: (no result)

## 2025-06-03 PROCEDURE — 88341 IMHCHEM/IMCYTCHM EA ADD ANTB: CPT

## 2025-06-03 PROCEDURE — 88342 IMHCHEM/IMCYTCHM 1ST ANTB: CPT

## 2025-06-03 PROCEDURE — 85025 COMPLETE CBC W/AUTO DIFF WBC: CPT

## 2025-06-03 PROCEDURE — 88307 TISSUE EXAM BY PATHOLOGIST: CPT

## 2025-06-03 PROCEDURE — 19303 MAST SIMPLE COMPLETE: CPT

## 2025-06-03 RX ADMIN — POTASSIUM CHLORIDE ONE MLS: 14.9 INJECTION, SOLUTION INTRAVENOUS at 08:43

## 2025-06-03 RX ADMIN — HEPARIN SODIUM SCH UNIT: 5000 INJECTION, SOLUTION INTRAVENOUS; SUBCUTANEOUS at 15:10

## 2025-06-03 RX ADMIN — POTASSIUM CHLORIDE ONE MLS: 14.9 INJECTION, SOLUTION INTRAVENOUS at 07:45

## 2025-06-03 RX ADMIN — HEPARIN SODIUM PRN UNIT: 5000 INJECTION, SOLUTION INTRAVENOUS; SUBCUTANEOUS at 08:28

## 2025-06-03 RX ADMIN — DEXAMETHASONE SODIUM PHOSPHATE ONE MG: 4 INJECTION, SOLUTION INTRAMUSCULAR; INTRAVENOUS at 08:28

## 2025-06-03 RX ADMIN — TRAMADOL HYDROCHLORIDE PRN MG: 50 TABLET ORAL at 15:10

## 2025-06-03 RX ADMIN — POTASSIUM CHLORIDE SCH MLS/HR: 14.9 INJECTION, SOLUTION INTRAVENOUS at 07:45

## 2025-06-03 RX ADMIN — ONDANSETRON ONE MG: 2 INJECTION INTRAMUSCULAR; INTRAVENOUS at 08:28

## 2025-06-03 RX ADMIN — FENTANYL CITRATE PRN MCG: 50 INJECTION, SOLUTION INTRAMUSCULAR; INTRAVENOUS at 13:25

## 2025-06-03 RX ADMIN — LIDOCAINE HYDROCHLORIDE ONE ML: 10 INJECTION, SOLUTION INFILTRATION; PERINEURAL at 09:10

## 2025-06-03 RX ADMIN — CEFAZOLIN SODIUM PRN MLS: 10 INJECTION, POWDER, FOR SOLUTION INTRAVENOUS at 09:10

## 2025-06-03 RX ADMIN — LIDOCAINE HYDROCHLORIDE ONE ML: 10 INJECTION, SOLUTION INFILTRATION; PERINEURAL at 12:08

## 2025-06-03 RX ADMIN — CEFAZOLIN SCH: 330 INJECTION, POWDER, FOR SOLUTION INTRAMUSCULAR; INTRAVENOUS at 14:54

## 2025-06-03 RX ADMIN — POTASSIUM CHLORIDE ONE MLS: 14.9 INJECTION, SOLUTION INTRAVENOUS at 11:37

## 2025-06-03 RX ADMIN — ACETAMINOPHEN PRN MG: 500 TABLET ORAL at 08:28

## 2025-06-03 RX ADMIN — POTASSIUM CHLORIDE ONE MLS: 14.9 INJECTION, SOLUTION INTRAVENOUS at 09:35

## 2025-06-03 RX ADMIN — LIDOCAINE HYDROCHLORIDE PRN ML: 10 INJECTION, SOLUTION INFILTRATION; PERINEURAL at 07:45

## 2025-06-04 VITALS — HEART RATE: 88 BPM | DIASTOLIC BLOOD PRESSURE: 78 MMHG | TEMPERATURE: 99.2 F | SYSTOLIC BLOOD PRESSURE: 134 MMHG

## 2025-06-04 VITALS — RESPIRATION RATE: 16 BRPM

## 2025-06-04 LAB
BASOPHILS # BLD AUTO: 0.02 10*3/UL (ref 0–0.1)
BASOPHILS NFR BLD AUTO: 0.2 %
EOSINOPHIL # BLD AUTO: 0.12 10*3/UL (ref 0.04–0.35)
EOSINOPHIL NFR BLD AUTO: 1 %
ERYTHROCYTE [DISTWIDTH] IN BLOOD BY AUTOMATED COUNT: 3.63 10*6/UL (ref 4.1–5.2)
ERYTHROCYTE [DISTWIDTH] IN BLOOD: 12.7 % (ref 11.5–14.5)
HCT VFR BLD AUTO: 35.3 % (ref 37.2–46.3)
HGB BLD-MCNC: 11.9 G/DL (ref 12–15)
IMM GRANULOCYTES # BLD: 0.05 10*3/UL (ref 0–0.04)
LYMPHOCYTES # SPEC AUTO: 1.21 10*3/UL (ref 0.9–5)
LYMPHOCYTES NFR SPEC AUTO: 10.2 %
MCH RBC QN AUTO: 32.8 PG (ref 27–32)
MCHC RBC AUTO-ENTMCNC: 33.7 G/DL (ref 32–37)
MCV RBC AUTO: 97.2 FL (ref 80–97)
MONOCYTES # BLD AUTO: 0.76 10*3/UL (ref 0.2–1)
MONOCYTES NFR BLD AUTO: 6.4 %
NEUTROPHILS # BLD AUTO: 9.72 10*3/UL (ref 1.8–7.7)
NEUTROPHILS NFR BLD AUTO: 81.8 %
PLATELET # BLD AUTO: 169 10*3/UL (ref 140–440)
PMV BLD AUTO: 11.5 FL (ref 9.5–12.2)
WBC # BLD AUTO: 11.88 10*3/UL (ref 4.5–10)

## 2025-06-04 RX ADMIN — ONDANSETRON PRN MG: 2 INJECTION INTRAMUSCULAR; INTRAVENOUS at 08:33

## 2025-06-04 RX ADMIN — MORPHINE SULFATE STA MG: 2 INJECTION, SOLUTION INTRAMUSCULAR; INTRAVENOUS at 05:24

## 2025-06-04 RX ADMIN — HYDROMORPHONE HYDROCHLORIDE PRN MG: 1 INJECTION, SOLUTION INTRAMUSCULAR; INTRAVENOUS; SUBCUTANEOUS at 08:34

## 2025-06-12 ENCOUNTER — HOSPITAL ENCOUNTER (OUTPATIENT)
Dept: HOSPITAL 47 - WWCWWP | Age: 45
End: 2025-06-12
Attending: SURGERY
Payer: COMMERCIAL

## 2025-06-12 VITALS
RESPIRATION RATE: 17 BRPM | SYSTOLIC BLOOD PRESSURE: 115 MMHG | HEART RATE: 93 BPM | DIASTOLIC BLOOD PRESSURE: 77 MMHG | TEMPERATURE: 98.4 F

## 2025-06-12 DIAGNOSIS — Z91.011: ICD-10-CM

## 2025-06-12 DIAGNOSIS — Z88.5: ICD-10-CM

## 2025-06-12 DIAGNOSIS — D05.12: Primary | ICD-10-CM

## 2025-06-12 DIAGNOSIS — Z98.890: ICD-10-CM

## 2025-06-12 DIAGNOSIS — F17.200: ICD-10-CM

## 2025-06-12 DIAGNOSIS — Z91.018: ICD-10-CM

## 2025-07-02 ENCOUNTER — HOSPITAL ENCOUNTER (OUTPATIENT)
Dept: HOSPITAL 47 - WWCWWP | Age: 45
End: 2025-07-02
Attending: SURGERY
Payer: COMMERCIAL

## 2025-07-02 VITALS
HEART RATE: 86 BPM | TEMPERATURE: 97.8 F | DIASTOLIC BLOOD PRESSURE: 96 MMHG | SYSTOLIC BLOOD PRESSURE: 115 MMHG | RESPIRATION RATE: 17 BRPM

## 2025-07-02 DIAGNOSIS — D05.12: Primary | ICD-10-CM

## 2025-07-02 DIAGNOSIS — Z98.890: ICD-10-CM

## 2025-07-02 DIAGNOSIS — Z88.8: ICD-10-CM

## 2025-07-02 DIAGNOSIS — F17.200: ICD-10-CM

## 2025-07-02 DIAGNOSIS — Z91.011: ICD-10-CM

## 2025-07-02 DIAGNOSIS — Z91.018: ICD-10-CM

## 2025-07-11 ENCOUNTER — HOSPITAL ENCOUNTER (OUTPATIENT)
Dept: HOSPITAL 47 - WWCWWP | Age: 45
End: 2025-07-11
Attending: SURGERY
Payer: COMMERCIAL

## 2025-07-11 VITALS
SYSTOLIC BLOOD PRESSURE: 133 MMHG | TEMPERATURE: 97.7 F | HEART RATE: 90 BPM | RESPIRATION RATE: 17 BRPM | DIASTOLIC BLOOD PRESSURE: 78 MMHG

## 2025-07-11 DIAGNOSIS — F17.200: ICD-10-CM

## 2025-07-11 DIAGNOSIS — Z91.011: ICD-10-CM

## 2025-07-11 DIAGNOSIS — Z88.5: ICD-10-CM

## 2025-07-11 DIAGNOSIS — C50.912: Primary | ICD-10-CM

## 2025-07-11 DIAGNOSIS — Z91.018: ICD-10-CM

## 2025-07-11 DIAGNOSIS — Z91.030: ICD-10-CM
